# Patient Record
Sex: MALE | URBAN - METROPOLITAN AREA
[De-identification: names, ages, dates, MRNs, and addresses within clinical notes are randomized per-mention and may not be internally consistent; named-entity substitution may affect disease eponyms.]

---

## 2020-01-21 ENCOUNTER — OFFICE VISIT CONVERTED (OUTPATIENT)
Dept: FAMILY MEDICINE CLINIC | Facility: CLINIC | Age: 45
End: 2020-01-21
Attending: NURSE PRACTITIONER

## 2020-01-21 ENCOUNTER — CONVERSION ENCOUNTER (OUTPATIENT)
Dept: FAMILY MEDICINE CLINIC | Facility: CLINIC | Age: 45
End: 2020-01-21

## 2020-05-04 ENCOUNTER — OFFICE VISIT CONVERTED (OUTPATIENT)
Dept: PULMONOLOGY | Facility: CLINIC | Age: 45
End: 2020-05-04
Attending: INTERNAL MEDICINE

## 2021-05-15 VITALS
SYSTOLIC BLOOD PRESSURE: 102 MMHG | HEIGHT: 72 IN | TEMPERATURE: 97.6 F | BODY MASS INDEX: 36.98 KG/M2 | HEART RATE: 94 BPM | OXYGEN SATURATION: 96 % | DIASTOLIC BLOOD PRESSURE: 80 MMHG | WEIGHT: 273 LBS

## 2021-05-28 NOTE — PROGRESS NOTES
Patient: KENDRA COTTO     Acct: [id number]     Report: #[id number]  UNIT #: [id number]     : 1975    Encounter Date:2020  PRIMARY CARE: RIGOBERTO HE  ***Signed***  --------------------------------------------------------------------------------------------------------------------  History of Present Illness      Chief Complaint: New pt here for sleep apnea            Kendra Cotto is presenting for evaluation via Video and Audio conferencing.     Verbal consent obtained via Video and Audio before beginning the visit.            The following staff were present during the visit: Holly Floyd MA, Shun Carlisle DO            The patient is a 45 year old male with history of obstructive sleep apnea who     presents for ZOOM visit today. The patient has been diagnosed with sleep apnea     for more than 10 years now. He reports compliance with the use of his machine on    a nightly basis. He denies any headaches but has occasional snoring if his mask     does not fit appropriately or he forgets to wear his mask. He wears his mask 7/7    night for 7-8 hours per night. The patient feels well rested and has no fatigue     during the day and no need for naps. He complains of no chest pain, no shortness    of breath and has no other issues at this time. He feels that  his sleep apnea     is very well controlled, better with the use of his CPAP machine. Prior to using    his CPAP machine he felt like he could sleep fro 15 hours a day not feel well     rested. Overall he feels well rested and refreshed. He has no aggravating or     relieving factors for sleep apnea other than the use of his CPAP which seems to     completely control his symptoms.             Exam was done via ZOOM conference and it is entered in the chart.                           Past Med History      Sleep apnea      Flu-Not current      Pneumonia-Not Current      Overview of Symptoms      Pt complains of snoring, stops breathing             Allergies and Medications      Allergies:        Coded Allergies:             No Known Drug Allergies (Verified  Allergy, Intermediate, 5/4/20)      Medications    Last Reconciled on 5/4/20 10:05 by TYREE NAVA MD      Triamcinolone Acetonide (Nasacort) 16.9 Ml Mcpherson      1 PUFF NARE EACH BID, #1 BOTTLE         Reported         5/4/20       Fluticasone-Salmeterol 250-50 (Wixela 250-50 Inhub) 1 Each Blst.w.dev      1 PUFF INH BID for 30 Days, #1 INH         Reported         5/4/20       Metoprolol Succinate (Metoprolol Succinate) 25 Mg Tab.er.24h      25 MG PO QDAY, #30 TAB 0 Refills         Reported         5/4/20       Loratadine (Loratadine) 10 Mg Tablet      10 MG PO QDAY, #30 TAB 0 Refills         Reported         5/4/20       Montelukast Sodium (Singulair*) 10 Mg Tablet      10 MG PO QDAY, #30 TAB 0 Refills         Reported         5/4/20            Past Medical,Surg,Family Hx      Past Medical History:  None (seasonal allergies, asthma, sleep apnea)      Other Family History      Cancer-Grandfather      Asthma-Mom            Social History      Smoking status:  Former smoker (18yo, 1ppd quit 1996)            Review of Systems      General:  Denies: Appetite Change, Fatigue, Fever, Night Sweats, Weight Gain,     Weight Loss      ENT:  Denies Headache, Denies Hearing Loss, Denies Hoarseness, Denies Sore     Throat      Eye:  Denies Blurred Vision, Denies Corrective Lenses, Denies Diplopia, Denies     Vision Changes      Cardiovascular:  Denies Chest Pain, Denies Palpitations      Respiratory:  Denies: Cough, Coughing Blood, Productive Cough, Shortness of Air,    Wheezing      Gastrointestinal:  Denies Bloody Stools, Denies Constipation, Denies Diarrhea,     Denies Nausea/Vomiting, Denies Problem Swallowing, Denies Unable to Control     Bowels      Genitourinary:  Denies Blood in Urine, Denies Incontinence, Denies Painful     Urination      Musculoskeletal:  Denies Back Pain, Denies Muscle Pain,  Denies Painful Joints      Integumentary:  Denies Itching, Denies Lesions, Denies Rash      Neurologic:  Denies Dizziness, Denies Numbness\Tingling, Denies Seizures      Psychiatric:  Denies Anxiety, Denies Depression      Endocrine:  Denies Cold Intolerance, Denies Heat Intolerance      Hematologic/Lymphatic:  Denies Bruising, Denies Bleeding, Denies Enlarged Lymph     Nodes      Other      Seasonal allergies, snoring, stops breathing at night            Exam      Constitutional/Appearance:  Well Nourished, No Acute Distress      Head/Face:  Atraumatic      Eyes:  Extracocular move intact, No Scleral Icterus      Respiratory:  Breathing comfortably, No Cough      Skin: General Appearance:  No Visable Rashes on face, No Lesions on face      Neurologic Orientation:  Grossly orientated to Person, Place, No Facial Drop      Psychiatric:  Normal Mood, Normal Affect      Other      MP            Plan and Patient Instructions      Plan      ASSESSMENT/PLAN:      1. Obstructive sleep apnea well controlled. Continue PAP therapy at current     settings of 14 cm of water.       2. We will obtain compliance report from [facility] and resume his orders.       3. We will see the patient back in 1 year.       4. I have personally reviewed laboratory data, imaging and previous medical     records.      Instructions      * Chronic conditions reviewed and taken into consideration for today's treatment      plan.      * Patient instructed to seek medical attention urgently for new or worsening       symptoms.      * Patient was educated/instructed on their diagnosis, treatment and medications       prior to discharge from the Video and Audio visit today.            Electronically signed by Shun Carlisle  05/26/2020 11:29       Disclaimer: Converted document may not contain table formatting or lab diagrams. Please see [employer] for the authenticated document.